# Patient Record
Sex: FEMALE | Race: BLACK OR AFRICAN AMERICAN | NOT HISPANIC OR LATINO | Employment: FULL TIME | URBAN - METROPOLITAN AREA
[De-identification: names, ages, dates, MRNs, and addresses within clinical notes are randomized per-mention and may not be internally consistent; named-entity substitution may affect disease eponyms.]

---

## 2024-05-25 ENCOUNTER — HOSPITAL ENCOUNTER (OUTPATIENT)
Dept: OTHER | Facility: HOSPITAL | Age: 39
Discharge: HOME | End: 2024-05-25

## 2024-07-09 ENCOUNTER — OFFICE VISIT (OUTPATIENT)
Dept: NEUROSURGERY | Facility: CLINIC | Age: 39
End: 2024-07-09
Payer: OTHER GOVERNMENT

## 2024-07-09 VITALS
WEIGHT: 156.8 LBS | DIASTOLIC BLOOD PRESSURE: 76 MMHG | SYSTOLIC BLOOD PRESSURE: 121 MMHG | HEART RATE: 77 BPM | BODY MASS INDEX: 25.2 KG/M2 | HEIGHT: 66 IN

## 2024-07-09 DIAGNOSIS — M50.30 DEGENERATIVE DISC DISEASE, CERVICAL: Primary | ICD-10-CM

## 2024-07-09 DIAGNOSIS — M79.18 CERVICAL MYOFASCIAL PAIN SYNDROME: ICD-10-CM

## 2024-07-09 DIAGNOSIS — M54.12 CERVICAL RADICULOPATHY: ICD-10-CM

## 2024-07-09 PROCEDURE — 99203 OFFICE O/P NEW LOW 30 MIN: CPT | Performed by: NURSE PRACTITIONER

## 2024-07-09 RX ORDER — DEXTROAMPHETAMINE SACCHARATE, AMPHETAMINE ASPARTATE, DEXTROAMPHETAMINE SULFATE, AND AMPHETAMINE SULFATE 7.5; 7.5; 7.5; 7.5 MG/1; MG/1; MG/1; MG/1
30 TABLET ORAL 2 TIMES DAILY
COMMUNITY
Start: 2024-07-08 | End: 2024-10-06
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
## 2024-07-10 ENCOUNTER — PATIENT ROUNDING (BHMG ONLY) (OUTPATIENT)
Dept: NEUROSURGERY | Facility: CLINIC | Age: 39
End: 2024-07-10
Payer: OTHER GOVERNMENT

## 2024-07-15 ENCOUNTER — TELEPHONE (OUTPATIENT)
Dept: NEUROSURGERY | Facility: CLINIC | Age: 39
End: 2024-07-15

## 2024-07-15 NOTE — TELEPHONE ENCOUNTER
Caller: Lauren MATTHEWS    Relationship to patient: Self    Best call back number: [phone]    Patient is needing: PATIENT CALLED, STATES SHE IS ON ACTIVE DUTY AND HAS TO DO PT ON POST. PATIENT STATES THEY HAVE NOT RECEIVED REFERRAL TO START PT. PLEASE SEND REFERRAL TO  TO APPROVE AND ROB WILL SEND THE REFERRAL TO HER POST. PLEASE CALL PATIENT TO ADVISE IT HAS BEEN SENT.    THANK YOU

## 2024-07-16 NOTE — TELEPHONE ENCOUNTER
Call patient phone could not leave voicemail, our office can not do  referral because of patient been active duty, she will need to go to her PCP and have that referral done to . She can also call or stop by our office and we can fax the referral to her.

## 2024-07-29 ENCOUNTER — OFFICE VISIT (OUTPATIENT)
Dept: SLEEP MEDICINE | Facility: HOSPITAL | Age: 39
End: 2024-07-29
Payer: OTHER GOVERNMENT

## 2024-07-29 VITALS
HEIGHT: 66 IN | SYSTOLIC BLOOD PRESSURE: 123 MMHG | BODY MASS INDEX: 24.7 KG/M2 | WEIGHT: 153.7 LBS | HEART RATE: 90 BPM | OXYGEN SATURATION: 100 % | DIASTOLIC BLOOD PRESSURE: 84 MMHG

## 2024-07-29 DIAGNOSIS — F43.10 PTSD (POST-TRAUMATIC STRESS DISORDER): ICD-10-CM

## 2024-07-29 DIAGNOSIS — G47.419 TYPE 2 NARCOLEPSY: Primary | ICD-10-CM

## 2024-07-29 DIAGNOSIS — G93.32 CHRONIC FATIGUE SYNDROME: ICD-10-CM

## 2024-07-29 PROCEDURE — 99213 OFFICE O/P EST LOW 20 MIN: CPT | Performed by: INTERNAL MEDICINE

## 2024-07-29 PROCEDURE — G0463 HOSPITAL OUTPT CLINIC VISIT: HCPCS

## 2024-07-29 NOTE — PROGRESS NOTES
[address] [address]  [place]   [place] [address]  Phone: [phone]  Fax: [phone]      SLEEP CLINIC FOLLOW UP PROGRESS NOTE.    Lauren MATTHEWS (please note her previous name was Lauren Andrade)  [id number]   1985  39 y.o.  female      PCP: Beverly Anton APRN      Date of visit: 7/29/2024    Chief Complaint   Patient presents with    Daytime Sleepiness   Narcolepsy    HPI:  This is a 39 y.o. years old patient is here for the management of narcolepsy type II.  Patient is having the symptoms for a number of years but was being treated for ADHD.  She also has chronic fatigue syndrome along with narcolepsy and feels very tired and exhausted.  She is on stimulants which includes both Adderall extended release and immediate release    She  underwent a full night polysomnography which did not show any evidence of sleep apnea.  After the polysomnography she underwent multiple sleep latency test as a diagnostic work-up for narcolepsy because of her symptoms.  The multiple sleep latency test showed sleep latency of 1 minute and 24 seconds and 5 out of 5 naps had a REM sleep onset of sleep.  .  Normal sleep latency is more than 8 minutes and should not have any REM sleep onset.  The normal REM sleep onset is about 90 minutes after the sleep onset.  This is the electrophysiological signature diagnostic criteria.    I had a long talk with the patient about her diagnosis given of the education material.  There is no cataplexy symptoms and there is no family history of narcolepsy.  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  She reports that she has extreme fatigue and has to take several naps to cope up with her work.    At present she is getting modafinil and Adderall for her ADHD from behavioral health and wants to get her treatment plan switched over to sleep center here.  I have spoken with Dr. Blake Canchola at behavioral health phone #[phone]  [phone] and discussed management of her narcolepsy.     Patient is having difficulty time with the medical board in the [employer].  Finally she got 90% disability for narcolepsy.    Medications and allergies are reviewed by me and documented in the encounter.     SOCIAL (habits pertaining to sleep medicine)  History tobacco use:No   History of alcohol use: 0 per week  Caffeine use: 1     REVIEW OF SYSTEMS:   Touchet Sleepiness Scale :Total score: 18   Chronic fatigue; yes  Nasal congestion:No   Dry mouth/nose:No   Post nasal drip; No   Acid reflux/Heartburn:No   Abd bloating:No   Morning headache:No   Anxiety:Yes   Depression:Yes    PHYSICAL EXAMINATION:  CONSTITUTIONAL:  Vitals:    07/29/24 1300   BP: 123/84   Pulse: 90   SpO2: 100%   Weight: 69.7 kg (153 lb 11.2 oz)   Height: 167.7 cm (66.02\")    Body mass index is 24.79 kg/m².   NOSE: nasal passages are clear, No deformities noted   RESP SYSTEM: Not in any respiratory distress, no chest deformities noted,   CARDIOVASULAR: No edema noted  NEURO: Oriented x 3, gait normal,  Mood and affect appeared appropriate      I present she is on Adderall 30 mg twice a day along with 10 mg for breakthrough but she is getting the prescriptions from the post psychiatrist [place].  She is going to be changing to severely inside from October and wants to follow-up with me.      ASSESSMENT AND PLAN:  Narcolepsy without cataplexy ( Type II) Her multiple sleep latency test is positive for narcolepsy with 5 out of 5 episodes of REM sleep onset.  I had a long talk with the patient about the diagnosis and educated her about the condition.  She has severe narcolepsy.  She is on both extended release Adderall in the morning and immediate release Adderall 30 mg twice a day.  In spite of taking the medications she needs to have adequate amount of sleep minimum of 8 to 9 hours on a nightly basis.  Also she needs to take naps, power NAPS 3-4 times on a daily basis because of irresistible sleep " attacks which is part of the condition.  The sleep attacks are sudden and unpredictable.  She is also advised not to engage in dangerous activity including operating firearms, working on heights or dangerous situations.  She should not also drive more than 30 minutes at a time.  Narcolepsy is a lifelong condition.  The symptoms can only be controlled to a little extent but cannot be eliminated  Chronic fatigue syndrome.  Most patients with narcolepsy and hypersomnia have chronic fatigue.  ADHD  PTSD  Return in about 3 months (around 10/29/2024). . Patient's questions were answered.      Estevan Sosa MD  Sleep Medicine.  Medical Director, [facility] sleep [facility]  7/29/2024 ,

## 2025-02-25 ENCOUNTER — TRANSCRIBE ORDERS (OUTPATIENT)
Facility: HOSPITAL | Age: 40
End: 2025-02-25
Payer: OTHER GOVERNMENT

## 2025-02-25 DIAGNOSIS — R49.9 UNSPECIFIED VOICE AND RESONANCE DISORDER: Primary | ICD-10-CM

## 2025-03-12 ENCOUNTER — HOSPITAL ENCOUNTER (OUTPATIENT)
Facility: HOSPITAL | Age: 40
Setting detail: THERAPIES SERIES
Discharge: HOME | End: 2025-03-12
Payer: OTHER GOVERNMENT

## 2025-03-12 DIAGNOSIS — R47.1 DYSARTHRIA: Primary | ICD-10-CM

## 2025-03-12 PROCEDURE — 92522 EVALUATE SPEECH PRODUCTION: CPT | Performed by: SPEECH-LANGUAGE PATHOLOGIST
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
## 2025-05-28 ENCOUNTER — TELEPHONE (OUTPATIENT)
Facility: HOSPITAL | Age: 40
End: 2025-05-28
Payer: OTHER GOVERNMENT

## 2025-05-28 NOTE — TELEPHONE ENCOUNTER
Spoke with Ms. Elise regarding No Show this morning for her 08:00 appointment.  Patient indicated she thought it was on Thursday.  Patient was given her next appointment date and time, June 4, 0800.  I explained to her we cannot do Thursday appointments because I am in [facility] on Thursdays.      Yani Funk MS, CCC-SLP,CBIS

## 2025-06-04 ENCOUNTER — HOSPITAL ENCOUNTER (OUTPATIENT)
Facility: HOSPITAL | Age: 40
Setting detail: THERAPIES SERIES
Discharge: HOME | End: 2025-06-04
Payer: OTHER GOVERNMENT

## 2025-06-04 DIAGNOSIS — R47.1 DYSARTHRIA: Primary | ICD-10-CM

## 2025-06-04 PROCEDURE — 92507 TX SP LANG VOICE COMM INDIV: CPT | Performed by: SPEECH-LANGUAGE PATHOLOGIST

## 2025-06-04 NOTE — THERAPY PROGRESS REPORT/RE-CERT
Outpatient Adult Speech Language Therapy           Treatment Note and 30 Day Progress Note    Patient: Lauren MATTHEWS                                                                                     Visit Date: 2025  :     1985    Referring practitioner:    LAURA Lindsay  Date of Initial Visit:          Type: THERAPY  Episode:  Unspecified voice and resonance disorder     Patient seen for 2 sessions    Visit Diagnoses:    ICD-10-CM ICD-9-CM   1. Dysarthria  R47.1 784.51     SUBJECTIVE   Patient is here today to increase breast support, fluency and infection of speech for safe voicing of wants and needs during IADLS     Patient is attending her first therapy session.      Education: Goals, POC, diaphragmatic breathing     OBJECTIVE   GOALS     GOALS ACTIVITY PERFORMED TRIALS COMPLETED LEVEL OF CUEING REQUIRED     LTG 1: 12 weeks: Patient will increase Functional Communication Measure level to 6/7 for motor speech decreasing limitation to 1 to 19% to increase intelligibility of speech for safe communication during IADL S.                         STG 1a: 12 weeks: Patient will use diaphragmatic breathing skills to support phonation at the conversational level with min assist.  Diaphragmatic Breathing  Inhalation/exhalation times 10  Relaxed inhalation for 3 seconds; exhalation for 6 times 10    Relaxed inhalation for 4 seconds and exhalation for 8 seconds    Sustained /s/ upon exhalation    Sustained /m/ upon exhalation    Sustained /a/ upon exhalation  Max assist to demonstrate with patient placed in a supine position to allow for support when voicing. Patient given a teaching handout and HEP to practice at home.     STG 1b: 12 weeks: Patient will increase maximum phonation time to 10+ seconds with min assist.  MPT  4.95 seconds  Max assist for breath support    STG 1c: 12 weeks: Patient will use compensatory strategies to enhance  fluency at the sentence, paragraph and conversation levels with min assist.   Not addressed this 30 days        STG 1d: 12 weeks: Patient will increase inflection of speech at all levels of speech with mod assist.  Not addressed this 30 days                      ASSESSMENT/PLAN      ASSESSMENT: Patient requires the skills of a therapist to provide max assist to increase phonation time, and enhance fluency and inflection of speech for safe voicing of wants and needs during IADLs.    Progress:  No progress as this is her first session  PLAN: Continue plan of care     Progress Note Due Date:  Recertification Due Date:6/11/25     SIGNATURE: Yain Funk, SLP, [place] License #: [id number]  Electronically Signed on 6/4/2025            Adult Outpatient Rehabiliation

## 2025-06-25 ENCOUNTER — APPOINTMENT (OUTPATIENT)
Facility: HOSPITAL | Age: 40
End: 2025-06-25
Payer: OTHER GOVERNMENT